# Patient Record
Sex: MALE | Race: BLACK OR AFRICAN AMERICAN | NOT HISPANIC OR LATINO | URBAN - METROPOLITAN AREA
[De-identification: names, ages, dates, MRNs, and addresses within clinical notes are randomized per-mention and may not be internally consistent; named-entity substitution may affect disease eponyms.]

---

## 2017-11-14 ENCOUNTER — OFFICE (OUTPATIENT)
Dept: URBAN - METROPOLITAN AREA CLINIC 78 | Facility: CLINIC | Age: 47
End: 2017-11-14

## 2017-11-14 VITALS
TEMPERATURE: 98.7 F | WEIGHT: 138 LBS | SYSTOLIC BLOOD PRESSURE: 116 MMHG | HEIGHT: 67 IN | HEART RATE: 90 BPM | DIASTOLIC BLOOD PRESSURE: 82 MMHG

## 2017-11-14 DIAGNOSIS — R14.2 ERUCTATION: ICD-10-CM

## 2017-11-14 DIAGNOSIS — R12 HEARTBURN: ICD-10-CM

## 2017-11-14 DIAGNOSIS — R10.13 EPIGASTRIC PAIN: ICD-10-CM

## 2017-11-14 PROCEDURE — 99203 OFFICE O/P NEW LOW 30 MIN: CPT

## 2017-12-14 ENCOUNTER — INDEPENDENT LABORATORY (OUTPATIENT)
Dept: URBAN - METROPOLITAN AREA PATHOLOGY 17 | Facility: PATHOLOGY | Age: 47
End: 2017-12-14

## 2017-12-14 ENCOUNTER — OFFICE (OUTPATIENT)
Dept: URBAN - METROPOLITAN AREA CLINIC 32 | Facility: CLINIC | Age: 47
End: 2017-12-14

## 2017-12-14 VITALS
HEIGHT: 67 IN | WEIGHT: 138 LBS | DIASTOLIC BLOOD PRESSURE: 73 MMHG | SYSTOLIC BLOOD PRESSURE: 101 MMHG | SYSTOLIC BLOOD PRESSURE: 103 MMHG | HEART RATE: 83 BPM | DIASTOLIC BLOOD PRESSURE: 72 MMHG | SYSTOLIC BLOOD PRESSURE: 105 MMHG | RESPIRATION RATE: 16 BRPM | OXYGEN SATURATION: 94 % | OXYGEN SATURATION: 93 % | TEMPERATURE: 98.1 F | OXYGEN SATURATION: 98 % | TEMPERATURE: 97.9 F | DIASTOLIC BLOOD PRESSURE: 74 MMHG | DIASTOLIC BLOOD PRESSURE: 62 MMHG | OXYGEN SATURATION: 99 % | HEART RATE: 89 BPM | SYSTOLIC BLOOD PRESSURE: 90 MMHG | RESPIRATION RATE: 12 BRPM | RESPIRATION RATE: 14 BRPM

## 2017-12-14 DIAGNOSIS — K29.60 OTHER GASTRITIS WITHOUT BLEEDING: ICD-10-CM

## 2017-12-14 DIAGNOSIS — R10.13 EPIGASTRIC PAIN: ICD-10-CM

## 2017-12-14 DIAGNOSIS — R12 HEARTBURN: ICD-10-CM

## 2017-12-14 PROBLEM — K31.89 OTHER DISEASES OF STOMACH AND DUODENUM: Status: ACTIVE | Noted: 2017-12-14

## 2017-12-14 PROCEDURE — 88305 TISSUE EXAM BY PATHOLOGIST: CPT

## 2017-12-14 PROCEDURE — 88342 IMHCHEM/IMCYTCHM 1ST ANTB: CPT

## 2017-12-14 PROCEDURE — 88313 SPECIAL STAINS GROUP 2: CPT

## 2017-12-14 RX ADMIN — LIDOCAINE HYDROCHLORIDE 60 MG: 20 INJECTION, SOLUTION INFILTRATION; PERINEURAL at 07:50

## 2017-12-14 NOTE — SERVICEHPINOTES
Pt presents for EGD due to periodic epigastric pain, indigestion, and gas-related discomfort. Not currently on medications, not currently symptomatic.

## 2018-01-04 ENCOUNTER — OFFICE (OUTPATIENT)
Dept: URBAN - METROPOLITAN AREA CLINIC 78 | Facility: CLINIC | Age: 48
End: 2018-01-04

## 2018-01-04 VITALS
HEIGHT: 67 IN | WEIGHT: 141 LBS | DIASTOLIC BLOOD PRESSURE: 86 MMHG | TEMPERATURE: 98.5 F | HEART RATE: 96 BPM | SYSTOLIC BLOOD PRESSURE: 125 MMHG

## 2018-01-04 DIAGNOSIS — R12 HEARTBURN: ICD-10-CM

## 2018-01-04 DIAGNOSIS — R14.2 ERUCTATION: ICD-10-CM

## 2018-01-04 PROCEDURE — 99213 OFFICE O/P EST LOW 20 MIN: CPT

## 2018-03-22 ENCOUNTER — OFFICE (OUTPATIENT)
Dept: URBAN - METROPOLITAN AREA CLINIC 78 | Facility: CLINIC | Age: 48
End: 2018-03-22

## 2018-03-22 PROCEDURE — 00014: CPT
